# Patient Record
Sex: FEMALE | Race: WHITE | NOT HISPANIC OR LATINO | URBAN - METROPOLITAN AREA
[De-identification: names, ages, dates, MRNs, and addresses within clinical notes are randomized per-mention and may not be internally consistent; named-entity substitution may affect disease eponyms.]

---

## 2022-07-21 ENCOUNTER — OFFICE VISIT (OUTPATIENT)
Dept: FAMILY MEDICINE | Facility: CLINIC | Age: 42
End: 2022-07-21
Payer: MEDICARE

## 2022-07-21 VITALS
WEIGHT: 152.69 LBS | OXYGEN SATURATION: 96 % | HEART RATE: 62 BPM | DIASTOLIC BLOOD PRESSURE: 82 MMHG | BODY MASS INDEX: 26.21 KG/M2 | SYSTOLIC BLOOD PRESSURE: 118 MMHG

## 2022-07-21 DIAGNOSIS — G89.29 CHRONIC BILATERAL LOW BACK PAIN WITHOUT SCIATICA: ICD-10-CM

## 2022-07-21 DIAGNOSIS — M54.50 CHRONIC BILATERAL LOW BACK PAIN WITHOUT SCIATICA: ICD-10-CM

## 2022-07-21 DIAGNOSIS — Z13.220 SCREENING FOR HYPERLIPIDEMIA: ICD-10-CM

## 2022-07-21 DIAGNOSIS — E78.00 ELEVATED CHOLESTEROL: ICD-10-CM

## 2022-07-21 DIAGNOSIS — F41.0 PANIC ATTACKS: Primary | ICD-10-CM

## 2022-07-21 DIAGNOSIS — F50.89 OTHER SPECIFIED EATING DISORDER: ICD-10-CM

## 2022-07-21 PROCEDURE — 3079F PR MOST RECENT DIASTOLIC BLOOD PRESSURE 80-89 MM HG: ICD-10-PCS | Mod: CPTII,S$GLB,, | Performed by: PHYSICIAN ASSISTANT

## 2022-07-21 PROCEDURE — 1159F PR MEDICATION LIST DOCUMENTED IN MEDICAL RECORD: ICD-10-PCS | Mod: CPTII,S$GLB,, | Performed by: PHYSICIAN ASSISTANT

## 2022-07-21 PROCEDURE — 3008F PR BODY MASS INDEX (BMI) DOCUMENTED: ICD-10-PCS | Mod: CPTII,S$GLB,, | Performed by: PHYSICIAN ASSISTANT

## 2022-07-21 PROCEDURE — 3074F PR MOST RECENT SYSTOLIC BLOOD PRESSURE < 130 MM HG: ICD-10-PCS | Mod: CPTII,S$GLB,, | Performed by: PHYSICIAN ASSISTANT

## 2022-07-21 PROCEDURE — 3074F SYST BP LT 130 MM HG: CPT | Mod: CPTII,S$GLB,, | Performed by: PHYSICIAN ASSISTANT

## 2022-07-21 PROCEDURE — 3008F BODY MASS INDEX DOCD: CPT | Mod: CPTII,S$GLB,, | Performed by: PHYSICIAN ASSISTANT

## 2022-07-21 PROCEDURE — 99203 PR OFFICE/OUTPT VISIT, NEW, LEVL III, 30-44 MIN: ICD-10-PCS | Mod: S$GLB,,, | Performed by: PHYSICIAN ASSISTANT

## 2022-07-21 PROCEDURE — 99203 OFFICE O/P NEW LOW 30 MIN: CPT | Mod: S$GLB,,, | Performed by: PHYSICIAN ASSISTANT

## 2022-07-21 PROCEDURE — 1159F MED LIST DOCD IN RCRD: CPT | Mod: CPTII,S$GLB,, | Performed by: PHYSICIAN ASSISTANT

## 2022-07-21 PROCEDURE — 3079F DIAST BP 80-89 MM HG: CPT | Mod: CPTII,S$GLB,, | Performed by: PHYSICIAN ASSISTANT

## 2022-07-21 RX ORDER — BUSPIRONE HYDROCHLORIDE 10 MG/1
10 TABLET ORAL 3 TIMES DAILY
Qty: 90 TABLET | Refills: 1 | Status: SHIPPED | OUTPATIENT
Start: 2022-07-21 | End: 2022-08-14

## 2022-07-21 RX ORDER — HYDROCODONE BITARTRATE AND ACETAMINOPHEN 10; 325 MG/1; MG/1
1 TABLET ORAL 2 TIMES DAILY PRN
COMMUNITY
Start: 2022-07-11

## 2022-07-21 NOTE — PROGRESS NOTES
Subjective:       Patient ID: Savita Galeas is a 42 y.o. female.    Chief Complaint: Migraine    Anxiety  Presents for initial visit. Onset was 1 to 6 months ago. The problem has been gradually worsening. Symptoms include dizziness, excessive worry, muscle tension, nervous/anxious behavior, obsessions and restlessness. Patient reports no chest pain or shortness of breath. Primary symptoms comment: face feel flush. Symptoms occur occasionally. The severity of symptoms is mild. Exacerbated by: worried about her dad's cancer diagnosis. The quality of sleep is fair.     Past treatments include nothing.   No past medical history on file.    Review of Systems   Constitutional: Negative for activity change, appetite change, fatigue and fever.   HENT: Negative.    Respiratory: Negative for chest tightness and shortness of breath.    Cardiovascular: Negative for chest pain.   Gastrointestinal: Negative for abdominal pain.   Integumentary:  Negative.   Neurological: Positive for dizziness and headaches.   Psychiatric/Behavioral: The patient is nervous/anxious.          Objective:      Physical Exam  Vitals reviewed.   Constitutional:       General: She is not in acute distress.     Appearance: Normal appearance. She is not ill-appearing, toxic-appearing or diaphoretic.   HENT:      Head: Normocephalic and atraumatic.      Right Ear: Tympanic membrane, ear canal and external ear normal. There is no impacted cerumen.      Left Ear: Tympanic membrane, ear canal and external ear normal. There is no impacted cerumen.   Cardiovascular:      Rate and Rhythm: Normal rate and regular rhythm.      Pulses: Normal pulses.      Heart sounds: Normal heart sounds. No murmur heard.    No friction rub. No gallop.   Pulmonary:      Effort: Pulmonary effort is normal. No respiratory distress.      Breath sounds: Normal breath sounds. No stridor. No wheezing, rhonchi or rales.   Chest:      Chest wall: No tenderness.   Abdominal:      General:  Abdomen is flat. There is no distension.      Palpations: Abdomen is soft. There is no mass.      Tenderness: There is no abdominal tenderness. There is no right CVA tenderness, left CVA tenderness, guarding or rebound.      Hernia: No hernia is present.   Neurological:      General: No focal deficit present.      Mental Status: She is alert.   Psychiatric:         Mood and Affect: Mood normal.         Assessment:       Problem List Items Addressed This Visit    None     Visit Diagnoses     Panic attacks    -  Primary    Relevant Medications    busPIRone (BUSPAR) 10 MG tablet    Other Relevant Orders    Comprehensive Metabolic Panel    T4, Free    TSH    CBC Auto Differential    Screening for hyperlipidemia        Other specified eating disorder         Relevant Orders    CBC Auto Differential    Elevated cholesterol        Relevant Orders    Lipid Panel          Plan:       Panic attacks  -     Comprehensive Metabolic Panel; Future; Expected date: 07/21/2022  -     T4, Free; Future; Expected date: 07/21/2022  -     TSH; Future; Expected date: 07/21/2022  -     CBC Auto Differential; Future; Expected date: 07/21/2022  -     busPIRone (BUSPAR) 10 MG tablet; Take 1 tablet (10 mg total) by mouth 3 (three) times daily.  Dispense: 90 tablet; Refill: 1    Screening for hyperlipidemia    Other specified eating disorder   -     CBC Auto Differential; Future; Expected date: 07/21/2022    Elevated cholesterol  -     Lipid Panel; Future; Expected date: 07/21/2022       I spent 30 minutes on this encounter, time includes face-to-face, chart review, documentation, test review and orders.

## 2022-08-09 ENCOUNTER — PATIENT OUTREACH (OUTPATIENT)
Dept: ADMINISTRATIVE | Facility: HOSPITAL | Age: 42
End: 2022-08-09
Payer: MEDICARE

## 2022-08-09 NOTE — LETTER
August 9, 2022    Savita Galeas  [address] [address]             [facility]  [address]  [place] [address]  Phone: [phone] Dear Rachel [facility] is committed to your overall health.  To help you get the most out of each of your visits, we will review your information to make sure you are up to date on all of your recommended tests and/or procedures.      David Roca III, PA-C  has found that your chart shows you may be due for :    Health Maintenance Due   Topic    Hepatitis C Screening     Cervical Cancer Screening     Lipid Panel     HIV Screening     Mammogram     COVID-19 Vaccine (2 - Booster for Gabby series)       If you have had any of the above done at another facility, please bring the records or information with you so that your record at [facility] will be complete.  If you would like to schedule any of these test, please call [phone] or send a message via [email or web address].       If you are currently taking medication, please bring it with you to your appointment for review.        Thank you for letting us care for you,      David Roca III, PA-C and your [facility] Team

## 2022-08-09 NOTE — PROGRESS NOTES
2022 Care Everywhere updates requested and reviewed.  Immunizations reconciled. Media reports reviewed.  Duplicate HM overrides and  orders removed.  Overdue HM topic chart audit and/or requested.  Overdue lab testing linked to upcoming lab appointments if applies.  Lab georges, and [facility] reviewed   Pap Smear and Lab testing     DIS reviewed      Mammogram     Health Maintenance Due   Topic Date Due    Hepatitis C Screening  Never done    Cervical Cancer Screening  Never done    Lipid Panel  Never done    HIV Screening  Never done    Mammogram  Never done    COVID-19 Vaccine (2 - Booster for Gabby series) 2021

## 2024-02-09 ENCOUNTER — TELEPHONE (OUTPATIENT)
Dept: PRIMARY CARE CLINIC | Facility: CLINIC | Age: 44
End: 2024-02-09
Payer: MEDICARE

## 2025-07-03 ENCOUNTER — NURSE TRIAGE (OUTPATIENT)
Dept: ADMINISTRATIVE | Facility: CLINIC | Age: 45
End: 2025-07-03
Payer: MEDICARE

## 2025-07-03 NOTE — TELEPHONE ENCOUNTER
Patient c/o vaginal burning and lower abdominal pain. Advised per protocol to be seen in the clinic today. No appointments were available.  Advised the patient to call back with any further questions or if symptoms worsen.        Reason for Disposition   Female and ANY of the following: * Burning (pain) with urination* Unexplained lower abdominal pain* Abnormal color of vaginal discharge (i.e., yellow, green, gray)* Bad-smelling vaginal discharge    Additional Information   Negative: Female and EITHER of the following: * Constant lower abdominal pain lasting more than 2 hours* Unable to urinate (or only a few drops) and bladder feels very full   Negative: Male and EITHER of the following: * Fever and testicle pain or swelling* Unable to urinate (or only a few drops) and bladder feels very full   Negative: Fever and burning (pain) with urination    Protocols used: STI Exposure-A-OH